# Patient Record
Sex: FEMALE | Race: BLACK OR AFRICAN AMERICAN | NOT HISPANIC OR LATINO | Employment: FULL TIME | URBAN - METROPOLITAN AREA
[De-identification: names, ages, dates, MRNs, and addresses within clinical notes are randomized per-mention and may not be internally consistent; named-entity substitution may affect disease eponyms.]

---

## 2019-07-10 ENCOUNTER — HISTORICAL (OUTPATIENT)
Dept: RADIOLOGY | Facility: HOSPITAL | Age: 59
End: 2019-07-10

## 2021-03-08 ENCOUNTER — HISTORICAL (OUTPATIENT)
Dept: ADMINISTRATIVE | Facility: HOSPITAL | Age: 61
End: 2021-03-08

## 2021-03-08 LAB
ABS NEUT (OLG): 3.79 X10(3)/MCL (ref 2.1–9.2)
ALBUMIN SERPL-MCNC: 3.4 GM/DL (ref 3.4–4.8)
ALBUMIN/GLOB SERPL: 1.2 RATIO (ref 1.1–2)
ALP SERPL-CCNC: 73 UNIT/L (ref 40–150)
ALT SERPL-CCNC: 17 UNIT/L (ref 0–55)
AST SERPL-CCNC: 19 UNIT/L (ref 5–34)
BILIRUB SERPL-MCNC: 0.6 MG/DL (ref 0.2–1.2)
BILIRUBIN DIRECT+TOT PNL SERPL-MCNC: 0.2 MG/DL (ref 0–0.5)
BILIRUBIN DIRECT+TOT PNL SERPL-MCNC: 0.4 MG/DL (ref 0–0.8)
BUN SERPL-MCNC: 13.3 MG/DL (ref 9.8–20.1)
CALCIUM SERPL-MCNC: 8.5 MG/DL (ref 8.4–10.2)
CHLORIDE SERPL-SCNC: 105 MMOL/L (ref 98–107)
CHOLEST SERPL-MCNC: 104 MG/DL
CHOLEST/HDLC SERPL: 3 {RATIO} (ref 0–5)
CO2 SERPL-SCNC: 29 MMOL/L (ref 23–31)
CREAT SERPL-MCNC: 0.66 MG/DL (ref 0.57–1.11)
DEPRECATED CALCIDIOL+CALCIFEROL SERPL-MC: 31.6 NG/ML (ref 30–80)
ERYTHROCYTE [DISTWIDTH] IN BLOOD BY AUTOMATED COUNT: 14.8 % (ref 11.5–17)
EST. AVERAGE GLUCOSE BLD GHB EST-MCNC: 174.3 MG/DL
GLOBULIN SER-MCNC: 2.8 GM/DL (ref 2.4–3.5)
GLUCOSE SERPL-MCNC: 95 MG/DL (ref 82–115)
HBA1C MFR BLD: 7.7 %
HCT VFR BLD AUTO: 36.4 % (ref 37–47)
HDLC SERPL-MCNC: 31 MG/DL (ref 40–60)
HGB BLD-MCNC: 11.4 GM/DL (ref 12–16)
LDLC SERPL CALC-MCNC: 54 MG/DL (ref 50–140)
MCH RBC QN AUTO: 28.3 PG (ref 27–31)
MCHC RBC AUTO-ENTMCNC: 31.3 GM/DL (ref 33–36)
MCV RBC AUTO: 90.3 FL (ref 80–94)
NRBC BLD AUTO-RTO: 0 % (ref 0–0.2)
PLATELET # BLD AUTO: 207 X10(3)/MCL (ref 130–400)
PMV BLD AUTO: 11.8 FL (ref 7.4–10.4)
POTASSIUM SERPL-SCNC: 3.6 MMOL/L (ref 3.5–5.1)
PROT SERPL-MCNC: 6.2 GM/DL (ref 5.8–7.6)
RBC # BLD AUTO: 4.03 X10(6)/MCL (ref 4.2–5.4)
SODIUM SERPL-SCNC: 141 MMOL/L (ref 136–145)
TRIGL SERPL-MCNC: 93 MG/DL (ref 0–150)
VLDLC SERPL CALC-MCNC: 19 MG/DL
WBC # SPEC AUTO: 6.9 X10(3)/MCL (ref 4.5–11.5)

## 2021-06-07 ENCOUNTER — HISTORICAL (OUTPATIENT)
Dept: ADMINISTRATIVE | Facility: HOSPITAL | Age: 61
End: 2021-06-07

## 2021-06-07 LAB
ABS NEUT (OLG): 4.3 X10(3)/MCL (ref 2.1–9.2)
ALBUMIN SERPL-MCNC: 3.4 GM/DL (ref 3.4–4.8)
ALBUMIN/GLOB SERPL: 1.1 RATIO (ref 1.1–2)
ALP SERPL-CCNC: 86 UNIT/L (ref 40–150)
ALT SERPL-CCNC: 13 UNIT/L (ref 0–55)
AST SERPL-CCNC: 16 UNIT/L (ref 5–34)
BILIRUB SERPL-MCNC: 0.7 MG/DL (ref 0.2–1.2)
BILIRUBIN DIRECT+TOT PNL SERPL-MCNC: 0.3 MG/DL (ref 0–0.5)
BILIRUBIN DIRECT+TOT PNL SERPL-MCNC: 0.4 MG/DL (ref 0–0.8)
BUN SERPL-MCNC: 16.9 MG/DL (ref 9.8–20.1)
CALCIUM SERPL-MCNC: 9.3 MG/DL (ref 8.4–10.2)
CHLORIDE SERPL-SCNC: 103 MMOL/L (ref 98–107)
CHOLEST SERPL-MCNC: 107 MG/DL
CHOLEST/HDLC SERPL: 3 {RATIO} (ref 0–5)
CO2 SERPL-SCNC: 29 MMOL/L (ref 23–31)
CREAT SERPL-MCNC: 0.8 MG/DL (ref 0.57–1.11)
DEPRECATED CALCIDIOL+CALCIFEROL SERPL-MC: 35 NG/ML (ref 30–80)
ERYTHROCYTE [DISTWIDTH] IN BLOOD BY AUTOMATED COUNT: 14.9 % (ref 11.5–17)
EST. AVERAGE GLUCOSE BLD GHB EST-MCNC: 191.5 MG/DL
GLOBULIN SER-MCNC: 3 GM/DL (ref 2.4–3.5)
GLUCOSE SERPL-MCNC: 111 MG/DL (ref 82–115)
HBA1C MFR BLD: 8.3 %
HCT VFR BLD AUTO: 37 % (ref 37–47)
HDLC SERPL-MCNC: 32 MG/DL (ref 40–60)
HGB BLD-MCNC: 11.9 GM/DL (ref 12–16)
LDLC SERPL CALC-MCNC: 56 MG/DL (ref 50–140)
MCH RBC QN AUTO: 29.1 PG (ref 27–31)
MCHC RBC AUTO-ENTMCNC: 32.2 GM/DL (ref 33–36)
MCV RBC AUTO: 90.5 FL (ref 80–94)
NRBC BLD AUTO-RTO: 0 % (ref 0–0.2)
PLATELET # BLD AUTO: 201 X10(3)/MCL (ref 130–400)
PMV BLD AUTO: 12.1 FL (ref 7.4–10.4)
POTASSIUM SERPL-SCNC: 4 MMOL/L (ref 3.5–5.1)
PROT SERPL-MCNC: 6.4 GM/DL (ref 5.8–7.6)
RBC # BLD AUTO: 4.09 X10(6)/MCL (ref 4.2–5.4)
SODIUM SERPL-SCNC: 140 MMOL/L (ref 136–145)
TRIGL SERPL-MCNC: 96 MG/DL (ref 0–150)
VLDLC SERPL CALC-MCNC: 19 MG/DL
WBC # SPEC AUTO: 7.2 X10(3)/MCL (ref 4.5–11.5)

## 2021-07-18 ENCOUNTER — HISTORICAL (OUTPATIENT)
Dept: ADMINISTRATIVE | Facility: HOSPITAL | Age: 61
End: 2021-07-18

## 2021-07-18 LAB — HEMOCCULT SP1 STL QL: NEGATIVE

## 2021-12-06 ENCOUNTER — HISTORICAL (OUTPATIENT)
Dept: ADMINISTRATIVE | Facility: HOSPITAL | Age: 61
End: 2021-12-06

## 2021-12-06 LAB
ABS NEUT (OLG): 4.21 X10(3)/MCL (ref 2.1–9.2)
ALBUMIN SERPL-MCNC: 3.4 GM/DL (ref 3.4–4.8)
ALBUMIN/GLOB SERPL: 1.2 RATIO (ref 1.1–2)
ALP SERPL-CCNC: 89 UNIT/L (ref 40–150)
ALT SERPL-CCNC: 13 UNIT/L (ref 0–55)
AST SERPL-CCNC: 16 UNIT/L (ref 5–34)
BILIRUB SERPL-MCNC: 0.5 MG/DL (ref 0.2–1.2)
BILIRUBIN DIRECT+TOT PNL SERPL-MCNC: 0.2 MG/DL (ref 0–0.5)
BILIRUBIN DIRECT+TOT PNL SERPL-MCNC: 0.3 MG/DL (ref 0–0.8)
BUN SERPL-MCNC: 13.9 MG/DL (ref 9.8–20.1)
CALCIUM SERPL-MCNC: 9 MG/DL (ref 8.4–10.2)
CHLORIDE SERPL-SCNC: 103 MMOL/L (ref 98–107)
CHOLEST SERPL-MCNC: 114 MG/DL
CHOLEST/HDLC SERPL: 3 {RATIO} (ref 0–5)
CO2 SERPL-SCNC: 31 MMOL/L (ref 23–31)
CREAT SERPL-MCNC: 0.75 MG/DL (ref 0.57–1.11)
DEPRECATED CALCIDIOL+CALCIFEROL SERPL-MC: 33.5 NG/ML (ref 30–80)
ERYTHROCYTE [DISTWIDTH] IN BLOOD BY AUTOMATED COUNT: 14.2 % (ref 11.5–17)
EST. AVERAGE GLUCOSE BLD GHB EST-MCNC: 200.1 MG/DL
GLOBULIN SER-MCNC: 2.9 GM/DL (ref 2.4–3.5)
GLUCOSE SERPL-MCNC: 137 MG/DL (ref 82–115)
HBA1C MFR BLD: 8.6 %
HCT VFR BLD AUTO: 35.7 % (ref 37–47)
HDLC SERPL-MCNC: 33 MG/DL (ref 40–60)
HGB BLD-MCNC: 11.5 GM/DL (ref 12–16)
LDLC SERPL CALC-MCNC: 57 MG/DL (ref 50–140)
MCH RBC QN AUTO: 29.4 PG (ref 27–31)
MCHC RBC AUTO-ENTMCNC: 32.2 GM/DL (ref 33–36)
MCV RBC AUTO: 91.3 FL (ref 80–94)
NRBC BLD AUTO-RTO: 0 % (ref 0–0.2)
PLATELET # BLD AUTO: 211 X10(3)/MCL (ref 130–400)
PMV BLD AUTO: 11.9 FL (ref 7.4–10.4)
POTASSIUM SERPL-SCNC: 3.8 MMOL/L (ref 3.5–5.1)
PROT SERPL-MCNC: 6.3 GM/DL (ref 5.8–7.6)
RBC # BLD AUTO: 3.91 X10(6)/MCL (ref 4.2–5.4)
SODIUM SERPL-SCNC: 141 MMOL/L (ref 136–145)
TRIGL SERPL-MCNC: 118 MG/DL (ref 0–150)
VLDLC SERPL CALC-MCNC: 24 MG/DL
WBC # SPEC AUTO: 7.6 X10(3)/MCL (ref 4.5–11.5)

## 2022-03-03 ENCOUNTER — HISTORICAL (OUTPATIENT)
Dept: ADMINISTRATIVE | Facility: HOSPITAL | Age: 62
End: 2022-03-03

## 2022-03-03 LAB
ABS NEUT (OLG): 3.9 (ref 2.1–9.2)
ALBUMIN SERPL-MCNC: 3.5 G/DL (ref 3.4–4.8)
ALBUMIN/GLOB SERPL: 1.1 {RATIO} (ref 1.1–2)
ALP SERPL-CCNC: 88 U/L (ref 40–150)
ALT SERPL-CCNC: 19 U/L (ref 0–55)
AST SERPL-CCNC: 20 U/L (ref 5–34)
BASOPHILS # BLD AUTO: 0.01 10*3/UL (ref 0–0.2)
BASOPHILS NFR BLD AUTO: 0.1 % (ref 0–1)
BILIRUB SERPL-MCNC: 0.4 MG/DL (ref 0.2–1.2)
BILIRUBIN DIRECT+TOT PNL SERPL-MCNC: 0.2 (ref 0–0.5)
BILIRUBIN DIRECT+TOT PNL SERPL-MCNC: 0.2 (ref 0–0.8)
BUN SERPL-MCNC: 18.3 MG/DL (ref 9.8–20.1)
CALCIUM SERPL-MCNC: 9.2 MG/DL (ref 8.4–10.2)
CHLORIDE SERPL-SCNC: 101 MMOL/L (ref 98–107)
CHOLEST SERPL-MCNC: 234 MG/DL
CHOLEST/HDLC SERPL: 7 {RATIO} (ref 0–5)
CO2 SERPL-SCNC: 30 MMOL/L (ref 23–31)
CREAT SERPL-MCNC: 0.81 MG/DL (ref 0.57–1.11)
DEPRECATED CALCIDIOL+CALCIFEROL SERPL-MC: 31.6 NG/ML (ref 30–80)
EOSINOPHIL # BLD AUTO: 0.07 10*3/UL (ref 0–0.9)
EOSINOPHIL NFR BLD AUTO: 1 % (ref 0–6.4)
ERYTHROCYTE [DISTWIDTH] IN BLOOD BY AUTOMATED COUNT: 15 % (ref 11.5–17)
EST. AVERAGE GLUCOSE BLD GHB EST-MCNC: 162.8 MG/DL
GLOBULIN SER-MCNC: 3.1 G/DL (ref 2.4–3.5)
GLUCOSE SERPL-MCNC: 195 MG/DL (ref 82–115)
HBA1C MFR BLD: 7.3 %
HCT VFR BLD AUTO: 38.4 % (ref 37–47)
HDLC SERPL-MCNC: 35 MG/DL (ref 40–60)
HEMOLYSIS INTERF INDEX SERPL-ACNC: 3
HGB BLD-MCNC: 12.2 G/DL (ref 12–16)
ICTERIC INTERF INDEX SERPL-ACNC: 1
IMM GRANULOCYTES # BLD AUTO: 0.01 10*3/UL (ref 0–0.02)
IMM GRANULOCYTES NFR BLD AUTO: 0.1 % (ref 0–0.43)
LDLC SERPL CALC-MCNC: 133 MG/DL (ref 50–140)
LIPEMIC INTERF INDEX SERPL-ACNC: 13
LYMPHOCYTES # BLD AUTO: 2.33 10*3/UL (ref 0.6–4.6)
LYMPHOCYTES NFR BLD AUTO: 33.7 % (ref 16–44)
MANUAL DIFF? (OHS): NO
MCH RBC QN AUTO: 29.2 PG (ref 27–31)
MCHC RBC AUTO-ENTMCNC: 31.8 G/DL (ref 33–36)
MCV RBC AUTO: 91.9 FL (ref 80–94)
MONOCYTES # BLD AUTO: 0.59 10*3/UL (ref 0.1–1.3)
MONOCYTES NFR BLD AUTO: 8.5 % (ref 4–12.1)
NEUTROPHILS # BLD AUTO: 3.9 10*3/UL (ref 2.1–9.2)
NEUTROPHILS NFR BLD AUTO: 56.6 % (ref 43–73)
NRBC BLD AUTO-RTO: 0 % (ref 0–0.2)
PLATELET # BLD AUTO: 218 10*3/UL (ref 130–400)
PMV BLD AUTO: 12 FL (ref 7.4–10.4)
POTASSIUM SERPL-SCNC: 4.1 MMOL/L (ref 3.5–5.1)
PROT SERPL-MCNC: 6.6 G/DL (ref 5.8–7.6)
RBC # BLD AUTO: 4.18 10*6/UL (ref 4.2–5.4)
SODIUM SERPL-SCNC: 141 MMOL/L (ref 136–145)
TRIGL SERPL-MCNC: 331 MG/DL (ref 0–150)
VLDLC SERPL CALC-MCNC: 66 MG/DL
WBC # SPEC AUTO: 6.9 10*3/UL (ref 4.5–11.5)

## 2022-04-27 ENCOUNTER — HISTORICAL (OUTPATIENT)
Dept: ADMINISTRATIVE | Facility: HOSPITAL | Age: 62
End: 2022-04-27
Payer: MEDICARE

## 2022-04-27 ENCOUNTER — HOSPITAL ENCOUNTER (OUTPATIENT)
Dept: NUTRITION | Facility: HOSPITAL | Age: 62
End: 2022-04-29
Attending: INTERNAL MEDICINE | Admitting: INTERNAL MEDICINE

## 2022-04-27 LAB
ABS NEUT (OLG): 12.19 (ref 2.1–9.2)
ALBUMIN SERPL-MCNC: 4.2 G/DL (ref 3.4–4.8)
ALBUMIN/GLOB SERPL: 1.1 {RATIO} (ref 1.1–2)
ALP SERPL-CCNC: 101 U/L (ref 40–150)
ALT SERPL-CCNC: 16 U/L (ref 0–55)
APPEARANCE, UA: CLEAR
AST SERPL-CCNC: 21 U/L (ref 5–34)
BACTERIA SPEC CULT: NORMAL
BASOPHILS # BLD AUTO: 0 10*3/UL (ref 0–0.2)
BASOPHILS NFR BLD AUTO: 0 %
BILIRUB SERPL-MCNC: 1 MG/DL
BILIRUB UR QL STRIP: NEGATIVE
BILIRUBIN DIRECT+TOT PNL SERPL-MCNC: 0.4 (ref 0–0.5)
BILIRUBIN DIRECT+TOT PNL SERPL-MCNC: 0.6 (ref 0–0.8)
BUDDING YEAST: NORMAL
BUN SERPL-MCNC: 17.8 MG/DL (ref 9.8–20.1)
CALCIUM SERPL-MCNC: 9.4 MG/DL (ref 8.7–10.5)
CASTS, UA: NORMAL
CHLORIDE SERPL-SCNC: 102 MMOL/L (ref 98–107)
CO2 SERPL-SCNC: 27 MMOL/L (ref 23–31)
COLOR UR: YELLOW
CREAT SERPL-MCNC: 0.75 MG/DL (ref 0.55–1.02)
CRYSTALS: NORMAL
ERYTHROCYTE [DISTWIDTH] IN BLOOD BY AUTOMATED COUNT: 14.5 % (ref 11.5–17)
GLOBULIN SER-MCNC: 3.9 G/DL (ref 2.4–3.5)
GLUCOSE (UA): NEGATIVE
GLUCOSE SERPL-MCNC: 195 MG/DL (ref 82–115)
HCT VFR BLD AUTO: 44.3 % (ref 37–47)
HEMOLYSIS INTERF INDEX SERPL-ACNC: 16
HGB BLD-MCNC: 14.4 G/DL (ref 12–16)
HGB UR QL STRIP: NEGATIVE
ICTERIC INTERF INDEX SERPL-ACNC: 1
KETONES UR QL STRIP: NORMAL
LEUKOCYTE ESTERASE UR QL STRIP: NEGATIVE
LIPASE SERPL-CCNC: 29 U/L
LIPEMIC INTERF INDEX SERPL-ACNC: 0
LYMPHOCYTES # BLD AUTO: 1.1 10*3/UL (ref 0.6–4.6)
LYMPHOCYTES NFR BLD AUTO: 8 %
MANUAL DIFF? (OHS): NO
MCH RBC QN AUTO: 29.1 PG (ref 27–31)
MCHC RBC AUTO-ENTMCNC: 32.5 G/DL (ref 33–36)
MCV RBC AUTO: 89.5 FL (ref 80–94)
MONOCYTES # BLD AUTO: 0.2 10*3/UL (ref 0.1–1.3)
MONOCYTES NFR BLD AUTO: 1 %
NEUTROPHILS # BLD AUTO: 12.19 10*3/UL (ref 2.1–9.2)
NEUTROPHILS NFR BLD AUTO: 90 %
NITRITE UR QL STRIP: NEGATIVE
PH UR STRIP: 7 [PH] (ref 5–9)
PLATELET # BLD AUTO: 226 10*3/UL (ref 130–400)
PMV BLD AUTO: 11.6 FL (ref 9.4–12.4)
POTASSIUM SERPL-SCNC: 4.3 MMOL/L (ref 3.5–5.1)
PROT SERPL-MCNC: 8.1 G/DL (ref 5.8–7.6)
PROT UR QL STRIP: NORMAL
RBC # BLD AUTO: 4.95 10*6/UL (ref 4.2–5.4)
RBC #/AREA URNS HPF: NORMAL /[HPF] (ref 0–2)
SMALL ROUND CELLS, UA: NORMAL
SODIUM SERPL-SCNC: 141 MMOL/L (ref 136–145)
SP GR UR STRIP: 1.02 (ref 1–1.03)
SPERM URNS QL MICRO: NORMAL
SQUAMOUS EPITHELIAL, UA: 8 (ref 0–4)
UROBILINOGEN UR STRIP-ACNC: 0.2
WBC # SPEC AUTO: 13.5 10*3/UL (ref 4.5–11.5)
WBC #/AREA URNS HPF: NORMAL /[HPF] (ref 0–2)

## 2022-04-28 LAB
ABS NEUT (OLG): 11.75 (ref 2.1–9.2)
ALBUMIN SERPL-MCNC: 3.9 G/DL (ref 3.4–4.8)
ALBUMIN/GLOB SERPL: 1 {RATIO} (ref 1.1–2)
ALP SERPL-CCNC: 95 U/L (ref 40–150)
ALT SERPL-CCNC: 15 U/L (ref 0–55)
AST SERPL-CCNC: 20 U/L (ref 5–34)
BASOPHILS # BLD AUTO: 0 10*3/UL (ref 0–0.2)
BASOPHILS NFR BLD AUTO: 0 %
BILIRUB SERPL-MCNC: 1 MG/DL
BILIRUBIN DIRECT+TOT PNL SERPL-MCNC: 0.4 (ref 0–0.5)
BILIRUBIN DIRECT+TOT PNL SERPL-MCNC: 0.6 (ref 0–0.8)
BUN SERPL-MCNC: 17.5 MG/DL (ref 9.8–20.1)
CALCIUM SERPL-MCNC: 9.4 MG/DL (ref 8.7–10.5)
CBG: 192 (ref 70–115)
CBG: 253 (ref 70–115)
CBG: 259 (ref 70–115)
CHLORIDE SERPL-SCNC: 102 MMOL/L (ref 98–107)
CO2 SERPL-SCNC: 27 MMOL/L (ref 23–31)
CREAT SERPL-MCNC: 0.73 MG/DL (ref 0.55–1.02)
ERYTHROCYTE [DISTWIDTH] IN BLOOD BY AUTOMATED COUNT: 14.4 % (ref 11.5–17)
GLOBULIN SER-MCNC: 4.1 G/DL (ref 2.4–3.5)
GLUCOSE SERPL-MCNC: 204 MG/DL (ref 82–115)
HCT VFR BLD AUTO: 40.3 % (ref 37–47)
HEMOLYSIS INTERF INDEX SERPL-ACNC: 3
HEMOLYSIS INTERF INDEX SERPL-ACNC: 3
HGB BLD-MCNC: 13.2 G/DL (ref 12–16)
ICTERIC INTERF INDEX SERPL-ACNC: 1
LIPEMIC INTERF INDEX SERPL-ACNC: 2
LYMPHOCYTES # BLD AUTO: 1.2 10*3/UL (ref 0.6–4.6)
LYMPHOCYTES NFR BLD AUTO: 9 %
MAGNESIUM SERPL-MCNC: 1.8 MG/DL (ref 1.6–2.6)
MANUAL DIFF? (OHS): NO
MCH RBC QN AUTO: 29.6 PG (ref 27–31)
MCHC RBC AUTO-ENTMCNC: 32.8 G/DL (ref 33–36)
MCV RBC AUTO: 90.4 FL (ref 80–94)
MONOCYTES # BLD AUTO: 0.4 10*3/UL (ref 0.1–1.3)
MONOCYTES NFR BLD AUTO: 3 %
NEUTROPHILS # BLD AUTO: 11.75 10*3/UL (ref 2.1–9.2)
NEUTROPHILS NFR BLD AUTO: 88 %
PHOSPHATE SERPL-MCNC: 2.8 MG/DL (ref 2.3–4.7)
PLATELET # BLD AUTO: 216 10*3/UL (ref 130–400)
PMV BLD AUTO: 11.7 FL (ref 9.4–12.4)
POTASSIUM SERPL-SCNC: 3.4 MMOL/L (ref 3.5–5.1)
PROT SERPL-MCNC: 8 G/DL (ref 5.8–7.6)
RBC # BLD AUTO: 4.46 10*6/UL (ref 4.2–5.4)
SODIUM SERPL-SCNC: 140 MMOL/L (ref 136–145)
WBC # SPEC AUTO: 13.4 10*3/UL (ref 4.5–11.5)

## 2022-04-29 LAB — CBG: 196 (ref 70–115)

## 2022-05-02 ENCOUNTER — LAB REQUISITION (OUTPATIENT)
Dept: LAB | Facility: HOSPITAL | Age: 62
End: 2022-05-02
Payer: MEDICARE

## 2022-05-02 DIAGNOSIS — E11.65 TYPE 2 DIABETES MELLITUS WITH HYPERGLYCEMIA: ICD-10-CM

## 2022-05-02 LAB
ALBUMIN SERPL-MCNC: 3.7 GM/DL (ref 3.4–4.8)
ALBUMIN/GLOB SERPL: 1.2 RATIO (ref 1.1–2)
ALP SERPL-CCNC: 73 UNIT/L (ref 40–150)
ALT SERPL-CCNC: 21 UNIT/L (ref 0–55)
AST SERPL-CCNC: 30 UNIT/L (ref 5–34)
BILIRUBIN DIRECT+TOT PNL SERPL-MCNC: 0.4 MG/DL (ref 0–0.5)
BILIRUBIN DIRECT+TOT PNL SERPL-MCNC: 0.7 MG/DL (ref 0–0.8)
BILIRUBIN DIRECT+TOT PNL SERPL-MCNC: 1.1 MG/DL
BUN SERPL-MCNC: 29.6 MG/DL (ref 9.8–20.1)
CALCIUM SERPL-MCNC: 9.7 MG/DL (ref 8.4–10.2)
CHLORIDE SERPL-SCNC: 99 MMOL/L (ref 98–107)
CO2 SERPL-SCNC: 29 MMOL/L (ref 23–31)
CREAT SERPL-MCNC: 0.95 MG/DL (ref 0.55–1.02)
ERYTHROCYTE [DISTWIDTH] IN BLOOD BY AUTOMATED COUNT: 14.5 % (ref 11.5–17)
GLOBULIN SER-MCNC: 3.1 GM/DL (ref 2.4–3.5)
GLUCOSE SERPL-MCNC: 104 MG/DL (ref 82–115)
HCT VFR BLD AUTO: 44 % (ref 37–47)
HGB BLD-MCNC: 14.4 GM/DL (ref 12–16)
MCH RBC QN AUTO: 29.8 PG (ref 27–31)
MCHC RBC AUTO-ENTMCNC: 32.7 MG/DL (ref 33–36)
MCV RBC AUTO: 91.1 FL (ref 80–94)
NRBC BLD AUTO-RTO: 0 %
PLATELET # BLD AUTO: 250 X10(3)/MCL (ref 130–400)
PMV BLD AUTO: 11.4 FL (ref 9.4–12.4)
POTASSIUM SERPL-SCNC: 3.2 MMOL/L (ref 3.5–5.1)
PROT SERPL-MCNC: 6.8 GM/DL (ref 5.8–7.6)
RBC # BLD AUTO: 4.83 X10(6)/MCL (ref 4.2–5.4)
SODIUM SERPL-SCNC: 139 MMOL/L (ref 136–145)
WBC # SPEC AUTO: 11.1 X10(3)/MCL (ref 4.5–11.5)

## 2022-05-02 PROCEDURE — 85027 COMPLETE CBC AUTOMATED: CPT | Performed by: FAMILY MEDICINE

## 2022-05-02 PROCEDURE — 80053 COMPREHEN METABOLIC PANEL: CPT | Performed by: FAMILY MEDICINE

## 2022-05-14 NOTE — DISCHARGE SUMMARY
Admit and Discharge Dates  Admit Date: 04/27/2022  Discharge Date: 04/29/2022  Physicians  Attending Physician - Nickolas GUTHRIE, Annmarie PATTERSON  Admitting Physician - Nickolas GUTHRIE, Annmarie PATTERSON  Primary Care Physician - Physician MD, Non Staff  Discharge Diagnosis  1.?DM gastroparesis?E11.43  2.?Intractable nausea and vomiting?R11.2,?Intractable nausea and vomiting?R11.2  ?  1. ?Diabetic gastroparesis?flare  2.? Dehydration  3.? HTN urgency  ?   HX T2DM, HTN, HLD, CVA/right deficit  Surgical Procedures  No procedures recorded for this visit.  Immunizations  No immunizations recorded for this visit.  Admission Information  This is a 61-year-old female medical history of?obesity, remote CVA with right-sided weakness, HTN, T2DM,?diabetic gastroparesis?presented to the ED?with complaint of?upper abdominal pain,?nausea and nonbloody?vomiting that started this morning. ?Report unable to keep anything down.??Last bowel movement yesterday,?no diarrhea. ?Denies fever or chills.  ?   On arrival to ED, She was afebrile, hypertensive, saturating 97% on room air. ?Labs notable for?glucose 195, CO2 27, Potassium 4.3,?normal lipase, CBC hemoconcentrated compared to her baseline.? X-ray KUB-radiology read pending, nonspecific?bowel gas?pattern?on my review.  ?   She was given IV hydralazine,?Reglan,?1 L of normal saline?and referred to hospital medicine service for further evaluation and management. [1]  Hospital Course  Essentially admitted for nausea and vomiting.? She had a CT scan done which was negative.? No other issues at this time. ?She has improved and tolerating diet. ?No nausea or vomiting this morning. ?No abdominal pain.? I will put her on Reglan as needed before meals?to get her over this episode of gastroparesis.? Also blood pressure has been high and thus we will adjust some of her medications.  Time Spent on discharge  35 minutes  Objective  Vitals & Measurements  T:?36.9? ?C (Oral)? TMIN:?36.3? ?C (Oral)? TMAX:?36.9? ?C (Oral)?  HR:?91(Peripheral)? RR:?20? BP:?150/82? SpO2:?94%?  Physical Exam  General: In no acute distress, afebrile  Chest: Clear to auscultation bilaterally  Heart: S1, S2, no appreciable murmur  Abdomen: Soft, nontender, BS +  MSK: Warm, no lower extremity edema, no clubbing or cyanosis  Neurologic: Alert and oriented x4  Patient Discharge Condition  Improved and stable  Nursing home   Discharge Medication Reconciliation  Prescribed  amLODIPine (amLODIPine 10 mg oral tablet)?10 mg, Oral, Daily  hydrALAZINE (hydrALAZINE 50 mg oral tablet)?50 mg, Oral, BID  losartan (losartan 100 mg oral tablet)?100 mg, Oral, Daily  metoclopramide (Reglan 5 mg oral tablet)?5 mg, Oral, TIDAC, PRN nausea/vomiting  Continue  aspirin (aspirin 81 mg oral tablet)?81 mg, Oral, Daily  clopidogrel (CLOPIDOGREL 75 MG TABLET)?75 mg, Oral, Daily  linaclotide (Linzess)?145 mcg, Oral, Daily  nebivolol (Bystolic 5 mg oral tablet)?5 mg, Oral, At Bedtime  Discontinue  amLODIPine (AMLODIPINE BESYLATE 5 MG TAB)?5 mg, Oral, Daily  insulin glargine (Lantus Solostar Pen 100 units/mL subcutaneous solution)?10 units, Subcutaneous, Daily  lisinopril (lisinopril 10 mg oral tablet)?10 mg, Oral, Daily  pantoprazole (Protonix 40 mg ORAL enteric coated tablet)?40 mg, Oral, Daily  Education and Orders Provided  Diabetes Basics  DASH Eating Plan  Discharge - 04/29/22 8:56:00 CDT, Home, Give all scheduled vaccinations prior to discharge.?  Discharge Activity - Activity as Tolerated?  Discharge Diet - Regular?  Discharge - 04/29/22 10:19:00 CDT, Post-Acute Services/Facilities, Give all scheduled vaccinations prior to discharge.?  Follow up  Follow-up with PCP in 3 to 5 days  Please bring blood sugar log and blood pressure log to next PCP visit.  sonia mehta [phone]  [employer]  No Qualifying Data     [1] Admission H & P; Annmarie Olmos MD 04/28/2022 00:07 CDT

## 2022-05-14 NOTE — ED PROVIDER NOTES
Patient:   Lindy Quezada             MRN: [id number]            FIN: [id number]               Age:   61 years     Sex:  Female     :  1960   Associated Diagnoses:   None   Author:   Sher Che MD      Addendum      Teaching-Supervisory Addendum-Brief   I participated in the following activities of this patients care: medical decision making.   I personally performed: supervision of the patient's care, the medical history, the physical exam, the medical decision making.   The case was discussed with: Diego Hamilton PA-C,.   Results interpretation: I agree with the study interpretation in this patient's care with the exception of of as documented by me..   Notes: This case was discussed with Diego Hamilton PA-C, An independent history and physical examination performed by me.    61-year-old female presents with abdominal pain nausea vomiting onset this morning.  Reports she had a bowel movement yesterday.  No fever or chills.  Mild tenderness in epigastric region.  Does have diabetes concern for gastroenteritis.  After multiple medications in the ED still having nausea and vomiting.  Admit for intractable nausea.   Vital signs: Basic Oxygen Information   2022 22:00 CDT      SpO2                      96 %                             Oxygen Therapy            Nasal cannula                             Oxygen Flow Rate          2 L/min    2022 21:00 CDT      SpO2                      94 %                             Oxygen Therapy            Room air    2022 20:00 CDT      SpO2                      93 %  LOW                             Oxygen Therapy            Room air    2022 19:35 CDT      SpO2                      94 %                             Oxygen Therapy            Room air    2022 19:00 CDT      Oxygen Therapy            Room air    2022 17:00 CDT      SpO2                      88 %  LOW                             Oxygen Therapy            Room air     4/27/2022 16:00 CDT      SpO2                      100 %    4/27/2022 15:40 CDT      SpO2                      97 %                             Oxygen Therapy            Room air  .

## 2022-05-14 NOTE — ED PROVIDER NOTES
Patient:   Lindy Quezada             MRN: [id number]            FIN: [id number]               Age:   61 years     Sex:  Female     :  1960   Associated Diagnoses:   Intractable nausea and vomiting; Intractable nausea and vomiting   Author:   Diego Hamilton PA-C      Basic Information   Time seen: Date & time 2022 15:51:00.   History source: Patient, EMS.   Arrival mode: Ambulance.   History limitation: None.   Additional information: Chief Complaint from Nursing Triage Note : Chief Complaint   2022 15:40 CDT      Chief Complaint           Pt c/o n/v, worsening. EMS gave 4mg Zofran IM. Hx. gastroparesis  .      History of Present Illness   The patient presents with   61 year old female with hx of gastroparesis presents to ED for worsening nausea and vomiting that began today - JAIRO Villalobos  and     Diego MCINTYRE PA-C, assumed care of this patient.   60 y/o F with hx of HTN, DM, CVA, and gastroparesis presents to the ED with persistent n/v onset this morning upon waking. Denies abdominal pain, constipation, diarrhea, fever, dysuria. Last BM was yesterday. .  The onset was this morning .  The course/duration of symptoms is constant.  The character of symptoms is bilious.  The degree at present is moderate.  The exacerbating factor is none.  The relieving factor is none.  Risk factors consist of diabetes mellitus.  Therapy today: emergency medical services.  Associated symptoms: none.  Additional history: none.        Review of Systems   Constitutional symptoms:  No fever, no chills.    Skin symptoms:  No jaundice, no rash.    Eye symptoms:  Vision unchanged, No blurred vision,    Respiratory symptoms:  No shortness of breath, no orthopnea, no cough, no sputum production.    Cardiovascular symptoms:  No chest pain, no palpitations, no diaphoresis, no peripheral edema.    Gastrointestinal symptoms:  Nausea, vomiting, no abdominal pain, no diarrhea, no constipation.     Genitourinary symptoms:  No dysuria, no hematuria.    Neurologic symptoms:  No headache, no dizziness.    Hematologic/Lymphatic symptoms:  Bleeding tendency negative,    Allergy/immunologic symptoms:  No impaired immunity,              Additional review of systems information: All other systems reviewed and otherwise negative.      Health Status   Allergies: No known allergies.   Medications:  (Selected)   Prescriptions  Prescribed  Bystolic 5 mg oral tablet: 5 mg = 1 tab(s), Oral, At Bedtime, # 14 tab(s), 0 Refill(s)  Lantus Solostar Pen 100 units/mL subcutaneous solution: 39 units, Subcutaneous, Daily, # 10 mL, 0 Refill(s)  Protonix 40 mg ORAL enteric coated tablet: 40 mg = 1 tab(s), Oral, BID, # 28 tab(s), 0 Refill(s)  lisinopril 10 mg oral tablet: 10 mg = 1 tab(s), Oral, Daily, # 14 tab(s), 0 Refill(s)  Documented Medications  Documented  AMLODIPINE BESYLATE 5 MG TAB: 5 mg = 1 tab(s), Oral, Daily  CLOPIDOGREL 75 MG TABLET: 75 mg = 1 tab(s), Oral, Daily  Linzess: 145 mcg, Oral, Daily, 0 Refill(s)  aspirin 81 mg oral tablet: 81 mg = 1 tab(s), Oral, Daily, # 30 tab(s), 0 Refill(s).   Immunizations: Per nurse's notes.      Past Medical/ Family/ Social History   Medical history:    Active  HTN - Hypertension ([id number])  DM - Diabetes mellitus (072475607)  Gastroparesis ([id number])  Resolved  CVA - Cerebrovascular accident ([id number]):  Resolved.  .   Surgical history: Negative.   Family history: Not significant.   Social history:    Social & Psychosocial Habits    Alcohol  11/15/2016  Use: Past    Substance Use  11/15/2016  Use: Never    Tobacco  11/15/2016  Use: Never smoker  .   Problem list:    Active Problems (4)  CVA (cerebral vascular accident)   DM (diabetes mellitus)   HTN - Hypertension   Impaired mobility   .      Physical Examination               Vital Signs   Vital Signs   4/27/2022 15:40 CDT      Temperature Oral          36.6 DegC                             Temperature Oral (calculated)              97.88 DegF                             Peripheral Pulse Rate     96 bpm                             Respiratory Rate          16 br/min                             SpO2                      97 %                             Oxygen Therapy            Room air                             Systolic Blood Pressure   189 mmHg  HI                             Diastolic Blood Pressure  94 mmHg  HI  .   Oxygen saturation.   General:  Alert, no acute distress.    Skin:  Warm, dry.    Head:  Normocephalic, atraumatic.    Neck:  Supple, trachea midline.    Eye:  Normal conjunctiva.   Ears, nose, mouth and throat:  Oral mucosa moist.   Cardiovascular:  Regular rate and rhythm, No murmur.    Respiratory:  Lungs are clear to auscultation, respirations are non-labored, breath sounds are equal.    Gastrointestinal:  Soft, Non distended, Tenderness: Negative, Guarding: Negative, Rebound: Negative.    Musculoskeletal:  Normal ROM.   Neurological:  Alert and oriented to person, place, time, and situation, No focal neurological deficit observed.    Psychiatric:  Cooperative.      Medical Decision Making   Differential Diagnosis:  Nausea, vomiting, abdominal pain, gastroenteritis, gastritis, bowel obstruction, dehydration, electrolyte abnormality, urinary tract infection.    Rationale:  61 y.o female with a history of gastroparesis presents to the ED with persistent nausea vomiting onset this morning.  Patient was given IM Zofran in route per EMS as well as a dose of Reglan and Phenergan here in the ED with no relief.  Patient's labs unremarkable; no signs of anion gap indicative of DKA. Pending urine. Given IV fluids in the ED. Will admit for intractable n/v due to gastroparesis. .   Documents reviewed:  Emergency department nurses' notes.   Orders  Launch Orders   Laboratory:  Urinalysis with Reflex (Order): Stat collect, Urine, 4/27/2022 15:54 CDT, Nurse collect, Print Label By Order Location  Lipase Level (Order): Stat collect,  4/27/2022 15:54 CDT, Blood, Lab Collect, Print Label By Order Location, 4/27/2022 15:54 CDT  CBC w/ Auto Diff (Order): Stat collect, 4/27/2022 15:54 CDT, Blood, Lab Collect, Print Label By Order Location, 4/27/2022 15:54 CDT  CMP (Order): Stat collect, 4/27/2022 15:54 CDT, Blood, Lab Collect, Print Label By Order Location, 4/27/2022 15:54 CDT, Launch Orders   Pharmacy:  Reglan (Order): 10 mg, form: Injection, IV Push, Once, first dose 4/27/2022 15:55 CDT, stop date 4/27/2022 15:55 CDT, STAT.    Results review:  Lab results : Lab View   4/27/2022 21:07 CDT      Est Creat Clearance Ser   77.65 mL/min    4/27/2022 20:29 CDT      Sodium Lvl                141 mmol/L                             Potassium Lvl             4.3 mmol/L                             Chloride                  102 mmol/L                             CO2                       27 mmol/L                             Calcium Lvl               9.4 mg/dL                             Glucose Lvl               195 mg/dL  HI                             BUN                       17.8 mg/dL                             Creatinine                0.75 mg/dL                             eGFR-AA                   >60  NA                             eGFR-JULIA                  >60 mL/min/1.73 m2  NA                             Bili Total                1.0 mg/dL                             Bili Direct               0.4 mg/dL                             Bili Indirect             0.60 mg/dL                             AST                       21 unit/L                             ALT                       16 unit/L                             Alk Phos                  101 unit/L                             Total Protein             8.1 gm/dL  HI                             Albumin Lvl               4.2 gm/dL                             Globulin                  3.9 gm/dL  HI                             A/G Ratio                 1.1 ratio                              Restick                   Done    4/27/2022 18:43 CDT      Restick                   Done    4/27/2022 16:04 CDT      Lipase Lvl                29 U/L                             WBC                       13.5 x10(3)/mcL  HI                             RBC                       4.95 x10(6)/mcL                             Hgb                       14.4 gm/dL                             Hct                       44.3 %                             Platelet                  226 x10(3)/mcL                             MCV                       89.5 fL                             MCH                       29.1 pg                             MCHC                      32.5 gm/dL  LOW                             RDW                       14.5 %                             MPV                       11.6 fL                             Abs Neut                  12.19 x10(3)/mcL  HI                             Neutro Auto               90 %  NA                             Lymph Auto                8 %  NA                             Mono Auto                 1 %  NA                             Basophil Auto             0 %  NA                             Abs Neutro                12.19 x10(3)/mcL  HI                             Abs Lymph                 1.1 x10(3)/mcL                             Abs Mono                  0.2 x10(3)/mcL                             Abs Baso                  0.0 x10(3)/mcL    .      Reexamination/ Reevaluation   Vital signs   Basic Oxygen Information   4/27/2022 15:40 CDT      SpO2                      97 %                             Oxygen Therapy            Room air        Impression and Plan   Diagnosis   Intractable nausea and vomiting (ODR81-BJ R11.2)    Diagnosis code search    History of diabetic gastroparesis (IBY15-KW Z86.39)      Calls-Consults   -  4/27/2022 21:30:00 , Isi Craig HM, consult, recommends admit for obs/IVF.    Plan   Condition: Stable.    Disposition: Admit  time  4/27/2022 21:30:00, Place in Observation Unit, Nickolas GUTHRIE, Annmarie GUERRA    Notes: I spoke with Dr. Che regarding the patient's care. He saw the patient face-to-face and agrees with the plan moving forward. .

## 2022-05-20 NOTE — HISTORICAL [facility] CERNER
This is a historical note converted from Hilda. Formatting and pictures may have been removed.  Please reference Hilda for original formatting and attached multimedia. Chief Complaint  Pt c/o n/v, worsening. EMS gave 4mg Zofran IM. Hx. gastroparesis  History of Present Illness  This is a 61-year-old female medical history of?obesity, remote CVA with right-sided weakness, HTN, T2DM,?diabetic gastroparesis?presented to the ED?with complaint of?upper abdominal pain,?nausea and nonbloody?vomiting that started this morning. ?Report unable to keep anything down.??Last bowel movement yesterday,?no diarrhea. ?Denies fever or chills.  ?  On arrival to ED, She was afebrile, hypertensive, saturating 97% on room air. ?Labs notable for?glucose 195, CO2 27, Potassium 4.3,?normal lipase, CBC hemoconcentrated compared to her baseline.? X-ray KUB-radiology read pending, nonspecific?bowel gas?pattern?on my review.  ?  She was given IV hydralazine,?Reglan,?1 L of normal saline?and referred to hospital medicine service for further evaluation and management.  Review of Systems  Except as documented, all other systems reviewed and are negative.  Physical Exam  Vitals & Measurements  T:?36.6? ?C (Oral)? HR:?106(Peripheral)? RR:?20? BP:?157/79? SpO2:?96%? WT:?116?kg?  General: Appears comfortable  HEENT:?NC/AT  Neck:? No JVD  Chest:?CTABL  CVS:?Regular rhythm. Normal S1/S2.  Abdomen:?nondistended, normoactive BS, soft and non-tender.  MSK:?No obvious deformity or joint swelling  Skin:?Warm and dry  Neuro:?AAOx3, no focal neurological deficit  Psych:?Cooperative  Assessment/Plan  1. ?Diabetic gastroparesis?flare  2.? Dehydration  3.? HTN urgency  ?  HX T2DM, HTN, HLD, CVA/right deficit  ?  Plan:  ?  - Clear liquid diet  - LR at 100 ml/hr  - Metoclopramide 5mg IV q8h  - PRN antiemetics  - SSI#2 q6h  - PRN IV antihypertensives for BP > 160/9000  - Home Meds pending verification ---Resumed amlodipine 10 mg,?nebivolol?10 mg,?losartan 100 mg,  hydralazine 50mg BID and Plavix and PPI  ?  PCP: Non staff  Code status: Full  ?  Critical care time:?31 minutes  Critical care diagnosis:?Hypertensive urgency   Problem List/Past Medical History  Old CVA/right-sided weakness  T2DM  Diabetic gastroparesis  Hypertension  Procedure/Surgical History  EGD  Colonoscopy  Medications  Inpatient  acetaminophen, 650 mg= 20.3 mL, Oral, q6hr, PRN  amLODIPine, 10 mg= 2 tab(s), Oral, Daily  atorvastatin 40 mg oral tablet, 80 mg= 2 tab(s), Oral, Daily  Bystolic, 10 mg, Oral, Daily  Dextrose 10% in Water intravenous solution, 125 mL, IV, As Directed, PRN  Dextrose 10% in Water intravenous solution, 125 mL, IV, As Directed, PRN  Dextrose 10% in Water intravenous solution, 125 mL, IV, Once, PRN  Dextrose 10% in Water intravenous solution, 250 mL, IV, As Directed, PRN  Dulcolax Laxative 10 mg RECTAL suppository, 10 mg= 1 supp, VT (rectal), Daily, PRN  enoxaparin, 40 mg= 0.4 mL, Subcutaneous, Daily  glucagon, 1 mg= 1 EA, IM, q10min, PRN  glucagon, 1 mg= 1 EA, IM, q10min, PRN  hydrALAZINE (Apresoline) Oral, 50 mg= 1 tab(s), Oral, BID  insulin lispro, 2-14 units, Subcutaneous, As Directed, PRN  labetalol, 10 mg= 2 mL, IV Push, q2hr, PRN  Linzess 145 mcg oral capsule, 145 mcg= 1 cap(s), Oral, Daily  losartan 50 mg oral tablet, 100 mg= 2 tab(s), Oral, Daily  [id number] 1,000 mL, 1000 mL, IV  metoclopramide, 5 mg= 1 mL, IV Push, q7bn-Anpxi  Phenergan, 12.5 mg= 0.5 mL, IM, q4hr, PRN  Plavix 75 mg oral tablet, 75 mg= 1 tab(s), Oral, Daily  Protonix, 40 mg= 1 EA, IV Slow, Daily  Zofran, 4 mg= 2 mL, IV Push, q4hr, PRN  Home  AMLODIPINE BESYLATE 5 MG TAB, 5 mg= 1 tab(s), Oral, Daily  aspirin 81 mg oral tablet, 81 mg= 1 tab(s), Oral, Daily  Bystolic 5 mg oral tablet, 5 mg= 1 tab(s), Oral, At Bedtime  CLOPIDOGREL 75 MG TABLET, 75 mg= 1 tab(s), Oral, Daily  Lantus Solostar Pen 100 units/mL subcutaneous solution, 39 units, Subcutaneous, Daily  Linzess, 145 mcg, Oral, Daily  lisinopril 10 mg oral  tablet, 10 mg= 1 tab(s), Oral, Daily  Protonix 40 mg ORAL enteric coated tablet, 40 mg= 1 tab(s), Oral, BID  Allergies  No Known Medication Allergies  Social History  Alcohol  Past, 11/15/2016  Substance Use  Never, 11/15/2016  Tobacco  Never smoker, 11/15/2016  Family History  Reviewed and negative  Lab Results  Labs Last 24 Hours?  ?Chemistry? Hematology/Coagulation?   Sodium Lvl: 141 mmol/L (04/27/22 20:29:00) WBC:?13.5 x10(3)/mcL?High (04/27/22 16:04:00)   Potassium Lvl: 4.3 mmol/L (04/27/22 20:29:00) RBC: 4.95 x10(6)/mcL (04/27/22 16:04:00)   Chloride: 102 mmol/L (04/27/22 20:29:00) Hgb: 14.4 gm/dL (04/27/22 16:04:00)   CO2: 27 mmol/L (04/27/22 20:29:00) Hct: 44.3 % (04/27/22 16:04:00)   Calcium Lvl: 9.4 mg/dL (04/27/22 20:29:00) Platelet: 226 x10(3)/mcL (04/27/22 16:04:00)   Glucose Lvl:?195 mg/dL?High (04/27/22 20:29:00) MCV: 89.5 fL (04/27/22 16:04:00)   BUN: 17.8 mg/dL (04/27/22 20:29:00) MCH: 29.1 pg (04/27/22 16:04:00)   Creatinine: 0.75 mg/dL (04/27/22 20:29:00) MCHC:?32.5 gm/dL?Low (04/27/22 16:04:00)   Est Creat Clearance Ser: 77.65 mL/min (04/27/22 21:07:46) RDW: 14.5 % (04/27/22 16:04:00)   eGFR-AA: >60 (04/27/22 20:29:00) MPV: 11.6 fL (04/27/22 16:04:00)   eGFR-JULIA: >60 (04/27/22 20:29:00) Abs Neut:?12.19 x10(3)/mcL?High (04/27/22 16:04:00)   Bili Total: 1 mg/dL (04/27/22 20:29:00) Neutro Auto: 90 % (04/27/22 16:04:00)   Bili Direct: 0.4 mg/dL (04/27/22 20:29:00) Lymph Auto: 8 % (04/27/22 16:04:00)   Bili Indirect: 0.6 mg/dL (04/27/22 20:29:00) Mono Auto: 1 % (04/27/22 16:04:00)   AST: 21 unit/L (04/27/22 20:29:00) Basophil Auto: 0 % (04/27/22 16:04:00)   ALT: 16 unit/L (04/27/22 20:29:00) Abs Neutro:?12.19 x10(3)/mcL?High (04/27/22 16:04:00)   Alk Phos: 101 unit/L (04/27/22 20:29:00) Abs Lymph: 1.1 x10(3)/mcL (04/27/22 16:04:00)   Total Protein:?8.1 gm/dL?High (04/27/22 20:29:00) Abs Mono: 0.2 x10(3)/mcL (04/27/22 16:04:00)   Albumin Lvl: 4.2 gm/dL (04/27/22 20:29:00) Abs Baso: 0 x10(3)/mcL  (04/27/22 16:04:00)   Globulin:?3.9 gm/dL?High (04/27/22 20:29:00)    A/G Ratio: 1.1 ratio (04/27/22 20:29:00)    Lipase Lvl: 29 U/L (04/27/22 16:04:00)    Restick: Done (04/27/22 20:29:00)        Prolonged Care Note  Time in: 705A  Time out:740A  Time spent: 35 min  A/P:  61 year old admitted to us for n/v without abd pain to me reported by patient.? she stated this happened before as well and responded to a soap suds enema.?  ?   on exam abd is soft non tender dull to percussion, obese  ?   constipation  n/v  gastritis - viral likely  ?   soap suds enema, continue oral laxatives  continue reglan iv scheduled  mild leukocytosis noted, does not appear to be septic.? will get ct abd

## 2022-06-06 ENCOUNTER — LAB REQUISITION (OUTPATIENT)
Dept: LAB | Facility: HOSPITAL | Age: 62
End: 2022-06-06
Payer: MEDICARE

## 2022-06-06 DIAGNOSIS — E78.2 MIXED HYPERLIPIDEMIA: ICD-10-CM

## 2022-06-06 DIAGNOSIS — I10 ESSENTIAL (PRIMARY) HYPERTENSION: ICD-10-CM

## 2022-06-06 DIAGNOSIS — E11.9 TYPE 2 DIABETES MELLITUS WITHOUT COMPLICATIONS: ICD-10-CM

## 2022-06-06 DIAGNOSIS — D64.9 ANEMIA, UNSPECIFIED: ICD-10-CM

## 2022-06-06 DIAGNOSIS — E55.9 VITAMIN D DEFICIENCY, UNSPECIFIED: ICD-10-CM

## 2022-06-06 LAB
ALBUMIN SERPL-MCNC: 3.4 GM/DL (ref 3.4–4.8)
ALBUMIN/GLOB SERPL: 1.3 RATIO (ref 1.1–2)
ALP SERPL-CCNC: 80 UNIT/L (ref 40–150)
ALT SERPL-CCNC: 12 UNIT/L (ref 0–55)
AST SERPL-CCNC: 16 UNIT/L (ref 5–34)
BILIRUBIN DIRECT+TOT PNL SERPL-MCNC: 0.6 MG/DL
BUN SERPL-MCNC: 18.7 MG/DL (ref 9.8–20.1)
CALCIUM SERPL-MCNC: 9.7 MG/DL (ref 8.4–10.2)
CHLORIDE SERPL-SCNC: 107 MMOL/L (ref 98–107)
CHOLEST SERPL-MCNC: 93 MG/DL
CHOLEST/HDLC SERPL: 3 {RATIO} (ref 0–5)
CO2 SERPL-SCNC: 28 MMOL/L (ref 23–31)
CREAT SERPL-MCNC: 0.75 MG/DL (ref 0.55–1.02)
DEPRECATED CALCIDIOL+CALCIFEROL SERPL-MC: 32.9 NG/ML (ref 30–80)
ERYTHROCYTE [DISTWIDTH] IN BLOOD BY AUTOMATED COUNT: 14.1 % (ref 11.5–17)
EST. AVERAGE GLUCOSE BLD GHB EST-MCNC: 182.9 MG/DL
GLOBULIN SER-MCNC: 2.6 GM/DL (ref 2.4–3.5)
GLUCOSE SERPL-MCNC: 112 MG/DL (ref 82–115)
HBA1C MFR BLD: 8 %
HCT VFR BLD AUTO: 34.5 % (ref 37–47)
HDLC SERPL-MCNC: 31 MG/DL (ref 35–60)
HGB BLD-MCNC: 11.1 GM/DL (ref 12–16)
LDLC SERPL CALC-MCNC: 45 MG/DL (ref 50–140)
MCH RBC QN AUTO: 29.2 PG (ref 27–31)
MCHC RBC AUTO-ENTMCNC: 32.2 MG/DL (ref 33–36)
MCV RBC AUTO: 90.8 FL (ref 80–94)
NRBC BLD AUTO-RTO: 0 %
PLATELET # BLD AUTO: 224 X10(3)/MCL (ref 130–400)
PMV BLD AUTO: 11.5 FL (ref 9.4–12.4)
POTASSIUM SERPL-SCNC: 4.2 MMOL/L (ref 3.5–5.1)
PROT SERPL-MCNC: 6 GM/DL (ref 5.8–7.6)
RBC # BLD AUTO: 3.8 X10(6)/MCL (ref 4.2–5.4)
SODIUM SERPL-SCNC: 142 MMOL/L (ref 136–145)
TRIGL SERPL-MCNC: 83 MG/DL (ref 37–140)
VLDLC SERPL CALC-MCNC: 17 MG/DL
WBC # SPEC AUTO: 6.8 X10(3)/MCL (ref 4.5–11.5)

## 2022-06-06 PROCEDURE — 80061 LIPID PANEL: CPT | Performed by: FAMILY MEDICINE

## 2022-06-06 PROCEDURE — 84075 ASSAY ALKALINE PHOSPHATASE: CPT | Performed by: FAMILY MEDICINE

## 2022-06-06 PROCEDURE — 80053 COMPREHEN METABOLIC PANEL: CPT | Performed by: FAMILY MEDICINE

## 2022-06-06 PROCEDURE — 82306 VITAMIN D 25 HYDROXY: CPT | Performed by: FAMILY MEDICINE

## 2022-06-06 PROCEDURE — 83036 HEMOGLOBIN GLYCOSYLATED A1C: CPT | Performed by: FAMILY MEDICINE

## 2022-06-06 PROCEDURE — 85027 COMPLETE CBC AUTOMATED: CPT | Performed by: FAMILY MEDICINE

## 2022-09-01 ENCOUNTER — LAB REQUISITION (OUTPATIENT)
Dept: LAB | Facility: HOSPITAL | Age: 62
End: 2022-09-01
Payer: MEDICARE

## 2022-09-01 DIAGNOSIS — R73.9 HYPERGLYCEMIA, UNSPECIFIED: ICD-10-CM

## 2022-09-01 DIAGNOSIS — E55.9 VITAMIN D DEFICIENCY, UNSPECIFIED: ICD-10-CM

## 2022-09-01 DIAGNOSIS — I10 ESSENTIAL (PRIMARY) HYPERTENSION: ICD-10-CM

## 2022-09-01 DIAGNOSIS — E78.5 HYPERLIPIDEMIA, UNSPECIFIED: ICD-10-CM

## 2022-09-01 LAB
ALBUMIN SERPL-MCNC: 3.5 GM/DL (ref 3.4–4.8)
ALBUMIN/GLOB SERPL: 1.2 RATIO (ref 1.1–2)
ALP SERPL-CCNC: 90 UNIT/L (ref 40–150)
ALT SERPL-CCNC: 13 UNIT/L (ref 0–55)
AST SERPL-CCNC: 17 UNIT/L (ref 5–34)
BILIRUBIN DIRECT+TOT PNL SERPL-MCNC: 0.5 MG/DL
BUN SERPL-MCNC: 17.1 MG/DL (ref 9.8–20.1)
CALCIUM SERPL-MCNC: 9.4 MG/DL (ref 8.4–10.2)
CHLORIDE SERPL-SCNC: 104 MMOL/L (ref 98–107)
CHOLEST SERPL-MCNC: 94 MG/DL
CHOLEST/HDLC SERPL: 3 {RATIO} (ref 0–5)
CO2 SERPL-SCNC: 28 MMOL/L (ref 23–31)
CREAT SERPL-MCNC: 0.86 MG/DL (ref 0.55–1.02)
DEPRECATED CALCIDIOL+CALCIFEROL SERPL-MC: 36.5 NG/ML (ref 30–80)
ERYTHROCYTE [DISTWIDTH] IN BLOOD BY AUTOMATED COUNT: 14.1 % (ref 11.5–17)
EST. AVERAGE GLUCOSE BLD GHB EST-MCNC: 162.8 MG/DL
GFR SERPLBLD CREATININE-BSD FMLA CKD-EPI: >60 MLS/MIN/1.73/M2
GLOBULIN SER-MCNC: 3 GM/DL (ref 2.4–3.5)
GLUCOSE SERPL-MCNC: 216 MG/DL (ref 82–115)
HBA1C MFR BLD: 7.3 %
HCT VFR BLD AUTO: 36.5 % (ref 37–47)
HDLC SERPL-MCNC: 31 MG/DL (ref 35–60)
HGB BLD-MCNC: 11.6 GM/DL (ref 12–16)
LDLC SERPL CALC-MCNC: 40 MG/DL (ref 50–140)
MCH RBC QN AUTO: 29.4 PG (ref 27–31)
MCHC RBC AUTO-ENTMCNC: 31.8 MG/DL (ref 33–36)
MCV RBC AUTO: 92.6 FL (ref 80–94)
NRBC BLD AUTO-RTO: 0 %
PLATELET # BLD AUTO: 215 X10(3)/MCL (ref 130–400)
PMV BLD AUTO: 12.4 FL (ref 7.4–10.4)
POTASSIUM SERPL-SCNC: 4.2 MMOL/L (ref 3.5–5.1)
PROT SERPL-MCNC: 6.5 GM/DL (ref 5.8–7.6)
RBC # BLD AUTO: 3.94 X10(6)/MCL (ref 4.2–5.4)
SODIUM SERPL-SCNC: 144 MMOL/L (ref 136–145)
TRIGL SERPL-MCNC: 113 MG/DL (ref 37–140)
TSH SERPL-ACNC: 1.33 UIU/ML (ref 0.35–4.94)
VLDLC SERPL CALC-MCNC: 23 MG/DL
WBC # SPEC AUTO: 7.5 X10(3)/MCL (ref 4.5–11.5)

## 2022-09-01 PROCEDURE — 80061 LIPID PANEL: CPT | Performed by: FAMILY MEDICINE

## 2022-09-01 PROCEDURE — 83036 HEMOGLOBIN GLYCOSYLATED A1C: CPT | Performed by: FAMILY MEDICINE

## 2022-09-01 PROCEDURE — 84443 ASSAY THYROID STIM HORMONE: CPT | Performed by: FAMILY MEDICINE

## 2022-09-01 PROCEDURE — 82306 VITAMIN D 25 HYDROXY: CPT | Performed by: FAMILY MEDICINE

## 2022-09-01 PROCEDURE — 80053 COMPREHEN METABOLIC PANEL: CPT | Performed by: FAMILY MEDICINE

## 2022-09-01 PROCEDURE — 85027 COMPLETE CBC AUTOMATED: CPT | Performed by: FAMILY MEDICINE

## 2023-07-27 DIAGNOSIS — R41.3 MEMORY LOSS: Primary | ICD-10-CM

## 2023-08-29 ENCOUNTER — APPOINTMENT (OUTPATIENT)
Dept: RADIOLOGY | Facility: HOSPITAL | Age: 63
End: 2023-08-29
Attending: FAMILY MEDICINE
Payer: MEDICARE

## 2023-08-29 DIAGNOSIS — R41.3 MEMORY LOSS: ICD-10-CM

## 2023-08-29 LAB
CREAT SERPL-MCNC: 0.7 MG/DL (ref 0.5–1.4)
SAMPLE: NORMAL

## 2023-08-29 PROCEDURE — 25500020 PHARM REV CODE 255: Performed by: FAMILY MEDICINE

## 2023-08-29 PROCEDURE — 70553 MRI BRAIN STEM W/O & W/DYE: CPT | Mod: TC

## 2023-08-29 PROCEDURE — A9577 INJ MULTIHANCE: HCPCS | Performed by: FAMILY MEDICINE

## 2023-08-29 RX ADMIN — GADOBENATE DIMEGLUMINE 20 ML: 529 INJECTION, SOLUTION INTRAVENOUS at 01:08

## 2024-11-03 ENCOUNTER — ANESTHESIA EVENT (OUTPATIENT)
Dept: ENDOSCOPY | Facility: HOSPITAL | Age: 64
End: 2024-11-03
Payer: MEDICARE

## 2024-11-03 RX ORDER — GLUCAGON 1 MG
1 KIT INJECTION
OUTPATIENT
Start: 2024-11-03

## 2024-11-03 RX ORDER — ONDANSETRON HYDROCHLORIDE 2 MG/ML
4 INJECTION, SOLUTION INTRAVENOUS ONCE AS NEEDED
OUTPATIENT
Start: 2024-11-03 | End: 2036-04-01

## 2024-11-03 RX ORDER — LIDOCAINE HYDROCHLORIDE 10 MG/ML
1 INJECTION, SOLUTION EPIDURAL; INFILTRATION; INTRACAUDAL; PERINEURAL ONCE
OUTPATIENT
Start: 2024-11-03 | End: 2024-11-03

## 2024-11-04 ENCOUNTER — ANESTHESIA (OUTPATIENT)
Dept: ENDOSCOPY | Facility: HOSPITAL | Age: 64
End: 2024-11-04
Payer: MEDICARE

## 2024-11-04 ENCOUNTER — HOSPITAL ENCOUNTER (OUTPATIENT)
Facility: HOSPITAL | Age: 64
Discharge: HOME | End: 2024-11-04
Attending: INTERNAL MEDICINE | Admitting: INTERNAL MEDICINE
Payer: MEDICARE

## 2024-11-04 VITALS
OXYGEN SATURATION: 96 % | WEIGHT: 214 LBS | HEART RATE: 77 BPM | TEMPERATURE: 97 F | SYSTOLIC BLOOD PRESSURE: 135 MMHG | HEIGHT: 69 IN | DIASTOLIC BLOOD PRESSURE: 69 MMHG | RESPIRATION RATE: 14 BRPM | BODY MASS INDEX: 31.7 KG/M2

## 2024-11-04 LAB — POCT GLUCOSE: 351 MG/DL (ref 70–110)

## 2024-11-04 PROCEDURE — 37000008 HC ANESTHESIA 1ST 15 MINUTES: Performed by: INTERNAL MEDICINE

## 2024-11-04 PROCEDURE — G0121 COLON CA SCRN NOT HI RSK IND: HCPCS | Performed by: INTERNAL MEDICINE

## 2024-11-04 PROCEDURE — 63600175 PHARM REV CODE 636 W HCPCS: Performed by: NURSE ANESTHETIST, CERTIFIED REGISTERED

## 2024-11-04 PROCEDURE — 37000009 HC ANESTHESIA EA ADD 15 MINS: Performed by: INTERNAL MEDICINE

## 2024-11-04 PROCEDURE — 25000003 PHARM REV CODE 250: Performed by: NURSE ANESTHETIST, CERTIFIED REGISTERED

## 2024-11-04 RX ORDER — GLYCOPYRROLATE 0.2 MG/ML
INJECTION INTRAMUSCULAR; INTRAVENOUS
Status: DISCONTINUED | OUTPATIENT
Start: 2024-11-04 | End: 2024-11-04

## 2024-11-04 RX ORDER — CLONIDINE HYDROCHLORIDE 0.1 MG/1
0.1 TABLET ORAL EVERY 6 HOURS PRN
COMMUNITY

## 2024-11-04 RX ORDER — FAMOTIDINE 20 MG/1
20 TABLET, FILM COATED ORAL 2 TIMES DAILY
COMMUNITY

## 2024-11-04 RX ORDER — LOSARTAN POTASSIUM 100 MG/1
100 TABLET ORAL DAILY
COMMUNITY

## 2024-11-04 RX ORDER — LIDOCAINE HYDROCHLORIDE 20 MG/ML
INJECTION INTRAVENOUS
Status: DISCONTINUED | OUTPATIENT
Start: 2024-11-04 | End: 2024-11-04

## 2024-11-04 RX ORDER — AMLODIPINE BESYLATE 10 MG/1
10 TABLET ORAL DAILY
COMMUNITY

## 2024-11-04 RX ORDER — CLOPIDOGREL BISULFATE 75 MG/1
75 TABLET ORAL DAILY
COMMUNITY

## 2024-11-04 RX ORDER — NEBIVOLOL 10 MG/1
20 TABLET ORAL DAILY
COMMUNITY

## 2024-11-04 RX ORDER — ROSUVASTATIN CALCIUM 40 MG/1
40 TABLET, COATED ORAL NIGHTLY
COMMUNITY

## 2024-11-04 RX ORDER — PROPOFOL 10 MG/ML
INJECTION, EMULSION INTRAVENOUS
Status: DISCONTINUED | OUTPATIENT
Start: 2024-11-04 | End: 2024-11-04

## 2024-11-04 RX ORDER — HYDRALAZINE HYDROCHLORIDE 50 MG/1
50 TABLET, FILM COATED ORAL 3 TIMES DAILY
COMMUNITY

## 2024-11-04 RX ORDER — DEXMEDETOMIDINE HYDROCHLORIDE 100 UG/ML
INJECTION, SOLUTION INTRAVENOUS
Status: DISCONTINUED | OUTPATIENT
Start: 2024-11-04 | End: 2024-11-04

## 2024-11-04 RX ORDER — HYDRALAZINE HYDROCHLORIDE 20 MG/ML
INJECTION INTRAMUSCULAR; INTRAVENOUS
Status: DISCONTINUED | OUTPATIENT
Start: 2024-11-04 | End: 2024-11-04

## 2024-11-04 RX ADMIN — DEXMEDETOMIDINE 10 MCG: 200 INJECTION, SOLUTION INTRAVENOUS at 09:11

## 2024-11-04 RX ADMIN — LIDOCAINE HYDROCHLORIDE 80 MG: 20 INJECTION INTRAVENOUS at 09:11

## 2024-11-04 RX ADMIN — HYDRALAZINE HYDROCHLORIDE 4 MG: 20 INJECTION INTRAMUSCULAR; INTRAVENOUS at 09:11

## 2024-11-04 RX ADMIN — PROPOFOL 50 MG: 10 INJECTION, EMULSION INTRAVENOUS at 09:11

## 2024-11-04 RX ADMIN — SODIUM CHLORIDE: 9 INJECTION, SOLUTION INTRAVENOUS at 09:11

## 2024-11-04 RX ADMIN — PROPOFOL 30 MG: 10 INJECTION, EMULSION INTRAVENOUS at 09:11

## 2024-11-04 RX ADMIN — GLYCOPYRROLATE 0.2 MG: 0.2 INJECTION INTRAMUSCULAR; INTRAVENOUS at 09:11

## 2024-11-04 NOTE — H&P
"[facility]    CC: Screening    HPI 64 y.o. female here for colorectal cancer screening. No current complaints.     Past Medical History  Past Medical History:   Diagnosis Date    CVA (cerebral vascular accident) 2015    Diabetes mellitus     Hyperlipidemia     Hypertension          Review of Systems  General ROS: negative for chills, fever or weight loss  Cardiovascular ROS: no chest pain or dyspnea on exertion  Gastrointestinal ROS: no abdominal pain, change in bowel habits, or black/ bloody stools    Physical Examination  Ht 5' 9" (1.753 m)   Wt 97.1 kg (214 lb)   BMI 31.60 kg/m²   General appearance: alert, cooperative, no distress  HENT: Normocephalic, atraumatic, neck symmetrical, no nasal discharge   Lungs: clear to auscultation bilaterally, no dullness to percussion bilaterally  Heart: regular rate and rhythm without rub; no displacement of the PMI   Abdomen: soft, non-tender; bowel sounds normoactive; no organomegaly  Extremities: extremities symmetric; no clubbing, cyanosis, or edema  Neurologic: Alert and oriented X 3, normal strength, normal coordination and gait    Labs:    Imaging:    I have personally reviewed and interpreted these images.    Assessment:   64 y.o. female here for colorectal cancer screening.  Risk of bleeding and perforation explained to patient.      Plan:  Colonoscopy      CONSUELO Durand Jr., M.D.  [facility]    "

## 2024-11-04 NOTE — ANESTHESIA PREPROCEDURE EVALUATION
11/03/2024  Lindy Quezada is a 64 y.o., female, who presents for the following:    Procedure: COLON (Abdomen)   Anesthesia type: General/MAC   Diagnosis: Screen for colon cancer [Z12.11]   Location: [facility] ENDO 01 / [facility] ENDOSCOPY   Surgeons: Rory Durand MD       Pre-op Assessment    I have reviewed the Patient Summary Reports.     I have reviewed the Nursing Notes. I have reviewed the NPO Status.   I have reviewed the Medications.     Review of Systems  Anesthesia Hx:  No problems with previous Anesthesia             Denies Family Hx of Anesthesia complications.    Denies Personal Hx of Anesthesia complications.                    Social:  Former Smoker       Cardiovascular:     Hypertension                                          Pulmonary:  Pulmonary Normal                       Endocrine:  Diabetes               Physical Exam  General: Alert and Oriented    Airway:  Mallampati: III   Mouth Opening: Normal  TM Distance: Normal  Tongue: Large  Neck ROM: Normal ROM    Dental:  Edentulous    Chest/Lungs:  Normal Respiratory Rate    Heart:  Rate: Normal  Rhythm: Regular Rhythm    Musculoskeletal:  RUE weakness, Hand contracture      Anesthesia Plan  Type of Anesthesia, risks & benefits discussed:    Anesthesia Type: Gen Natural Airway  Intra-op Monitoring Plan: Standard ASA Monitors  Post Op Pain Control Plan: IV/PO Opioids PRN  Induction:  IV  Airway Plan: Direct  Informed Consent: Informed consent signed with the Patient and all parties understand the risks and agree with anesthesia plan.  All questions answered. Patient consented to blood products? No  ASA Score: 2  Day of Surgery Review of History & Physical: H&P Update referred to the surgeon/provider.  Anesthesia Plan Notes: Nasal cannula vs facemask supplemental oxygenation   For patients with ZOHRA/obesity, may consider SuperNoval  Nasal CPAP      Ready For Surgery From Anesthesia Perspective.     .

## 2024-11-04 NOTE — TRANSFER OF CARE
"Anesthesia Transfer of Care Note    Patient: Lindy Quezada    Procedure(s) Performed: Procedure(s) (LRB):  COLON (N/A)    Patient location: GI    Anesthesia Type: MAC    Transport from OR: Transported from OR on room air with adequate spontaneous ventilation    Post pain: adequate analgesia    Post assessment: no apparent anesthetic complications and tolerated procedure well    Post vital signs: stable    Level of consciousness: awake, alert and responds to stimulation    Nausea/Vomiting: no nausea/vomiting    Complications: none    Transfer of care protocol was followed    Last vitals: Visit Vitals  BP (!) 169/83   Pulse 86   Temp 36.1 °C (97 °F)   Resp 16   Ht 5' 9" (1.753 m)   Wt 97.1 kg (214 lb)   SpO2 98%   BMI 31.60 kg/m²     "

## 2024-11-04 NOTE — ANESTHESIA POSTPROCEDURE EVALUATION
Anesthesia Post Evaluation    Patient: Lindy Quezada    Procedure(s) Performed: Procedure(s) (LRB):  COLON (N/A)    Final Anesthesia Type: general      Patient location during evaluation: GI PACU  Patient participation: Yes- Able to Participate  Level of consciousness: awake and alert  Post-procedure vital signs: reviewed and stable  Pain management: adequate  Airway patency: patent    PONV status at discharge: No PONV  Anesthetic complications: no      Cardiovascular status: hemodynamically stable  Respiratory status: unassisted, spontaneous ventilation and room air  Hydration status: euvolemic  Follow-up not needed.              Vitals Value Taken Time   /69 11/04/24 1029   Temp * 11/04/24 1505   Pulse 79 11/04/24 1031   Resp 17 11/04/24 1031   SpO2 96 % 11/04/24 1030   Vitals shown include unfiled device data.      No case tracking events are documented in the log.      Pain/Chuy Score: Chuy Score: 10 (11/4/2024 10:29 AM)

## 2024-11-04 NOTE — PROVATION PATIENT INSTRUCTIONS
Discharge Summary/Instructions after an Endoscopic Procedure  Patient Name: Lindy Queazda  Patient MRN: [id number]  Patient YOB: 1960  Monday, November 4, 2024  Rory Durand MD  Dear patient,  As a result of recent federal legislation (The Federal Cures Act), you may   receive lab or pathology results from your procedure in your [facility]   account before your physician is able to contact you. Your physician or   their representative will relay the results to you with their   recommendations at their soonest availability.  Thank you,  RESTRICTIONS:  During your procedure today, you received medications for sedation.  These   medications may affect your judgment, balance and coordination.  Therefore,   for 24 hours, you have the following restrictions:   - DO NOT drive a car, operate machinery, make legal/financial decisions,   sign important papers or drink alcohol.    ACTIVITY:  Today: no heavy lifting, straining or running due to procedural   sedation/anesthesia.  The following day: return to full activity including work.  DIET:  Eat and drink normally unless instructed otherwise.     TREATMENT FOR COMMON SIDE EFFECTS:  - Mild abdominal pain, nausea, belching, bloating or excessive gas:  rest,   eat lightly and use a heating pad.  - Sore Throat: treat with throat lozenges and/or gargle with warm salt   water.  - Because air was used during the procedure, expelling large amounts of air   from your rectum or belching is normal.  - If a bowel prep was taken, you may not have a bowel movement for 1-3 days.    This is normal.  SYMPTOMS TO WATCH FOR AND REPORT TO YOUR PHYSICIAN:  1. Abdominal pain or bloating, other than gas cramps.  2. Chest pain.  3. Back pain.  4. Signs of infection such as: chills or fever occurring within 24 hours   after the procedure.  5. Rectal bleeding, which would show as bright red, maroon, or black stools.   (A tablespoon of blood from the rectum is not serious,  especially if   hemorrhoids are present.)  6. Vomiting.  7. Weakness or dizziness.  GO DIRECTLY TO THE NEAREST EMERGENCY ROOM IF YOU HAVE ANY OF THE FOLLOWING:      Difficulty breathing              Chills and/or fever over 101 F   Persistent vomiting and/or vomiting blood   Severe abdominal pain   Severe chest pain   Black, tarry stools   Bleeding- more than one tablespoon   Any other symptom or condition that you feel may need urgent attention  Your doctor recommends these additional instructions:  If any biopsies were taken, your doctors clinic will contact you in 1 to 2   weeks with any results.  - Discharge patient to home (ambulatory).   - Resume previous diet.   - Continue present medications.   - Repeat colonoscopy in 5 years for screening purposes.   - Return to GI office PRN.  For questions, problems or results please call your physician - Rory Durand MD at Work:  [phone].  [facility] ED at [phone]  IF A COMPLICATION OR EMERGENCY SITUATION ARISES AND YOU ARE UNABLE TO REACH   YOUR PHYSICIAN - GO DIRECTLY TO THE EMERGENCY ROOM.  Rory Durand MD  11/4/2024 10:03:03 AM  This report has been verified and signed electronically.  Dear patient,  As a result of recent federal legislation (The Federal Cures Act), you may   receive lab or pathology results from your procedure in your [facility]   account before your physician is able to contact you. Your physician or   their representative will relay the results to you with their   recommendations at their soonest availability.  Thank you,  PROVATION

## 2024-12-20 DIAGNOSIS — R44.0 AUDITORY HALLUCINATIONS: ICD-10-CM

## 2024-12-20 DIAGNOSIS — R51.9 HEADACHE: ICD-10-CM

## 2024-12-20 DIAGNOSIS — R41.3 MEMORY DYSFUNCTION: Primary | ICD-10-CM

## 2025-01-07 ENCOUNTER — OFFICE VISIT (OUTPATIENT)
Dept: NEUROLOGY | Facility: CLINIC | Age: 65
End: 2025-01-07
Payer: MEDICARE

## 2025-01-07 VITALS
HEIGHT: 69 IN | BODY MASS INDEX: 31.7 KG/M2 | SYSTOLIC BLOOD PRESSURE: 140 MMHG | WEIGHT: 214 LBS | DIASTOLIC BLOOD PRESSURE: 90 MMHG

## 2025-01-07 DIAGNOSIS — G81.90 HEMIPLEGIA, UNSPECIFIED ETIOLOGY, UNSPECIFIED HEMIPLEGIA TYPE, UNSPECIFIED LATERALITY: Primary | ICD-10-CM

## 2025-01-07 DIAGNOSIS — E66.01 MORBID OBESITY: ICD-10-CM

## 2025-01-07 PROCEDURE — 99213 OFFICE O/P EST LOW 20 MIN: CPT | Mod: PBBFAC | Performed by: PSYCHIATRY & NEUROLOGY

## 2025-01-07 PROCEDURE — 99203 OFFICE O/P NEW LOW 30 MIN: CPT | Mod: S$PBB,,, | Performed by: PSYCHIATRY & NEUROLOGY

## 2025-01-07 PROCEDURE — 99999 PR PBB SHADOW E&M-EST. PATIENT-LVL III: CPT | Mod: PBBFAC,,, | Performed by: PSYCHIATRY & NEUROLOGY

## 2025-01-07 RX ORDER — INSULIN ASPART INJECTION 100 [IU]/ML
INJECTION, SOLUTION SUBCUTANEOUS
COMMUNITY
Start: 2024-10-02

## 2025-01-07 RX ORDER — HUMAN INSULIN 100 [IU]/ML
INJECTION, SOLUTION SUBCUTANEOUS
COMMUNITY
Start: 2024-10-26

## 2025-01-07 NOTE — PROGRESS NOTES
"[facility]   Outpatient Clinic Visit     SUBJECTIVE:      Chief Complaint: memory dysfunction , headache, and auditory hallucinations (New pt - referred by Dr. Stefano Reece/)     HPI: Lindy Quezada is a 64 y.o. yo female w/ PMH of HTN, HLD, T2DM, CVA who presents as a new patient for memory dysfunction.  Patient is accompanied by sister who aided in history.  Reports patient has been experiencing difficulty with task completion and memory dysfunction for the last few months that is episodic in nature. States she often forgets to complete tasks and once she has started a task she may forget what she was doing and has a hard time starting again. States the patient has good days and bad days. Also states she has a lot of difficulty remembering peoples names, even people she may have known for a long time. Is not able to state if symptoms appear to be progressing over time.  Of note, patient did have a CVA in 2015 resulting in right-sided weakness but denies any cognitive dysfunction since CVA. Was in a nursing home for several years until 2023, now lives with her daughters and family helps to care for her.  She does not do any household work and does not pay any bills.  Is currently in a wheelchair but reports she is able to walk with a cane for short distances. Denies any new-onset headaches or auditory hallucinations.  States it sometimes feels like she has "ocean waves" rolling through her head when she moves her head certain directions but denies any pain.  Otherwise without acute complaints.      Past Medical History:   has a past medical history of CVA (cerebral vascular accident) (2015), Diabetes mellitus, Hyperlipidemia, Hypertension, and Stroke (2015).     Past Surgical History:   has a past surgical history that includes Colonoscopy (N/A, 11/4/2024).     Family History:  family history is not on file.     Social History:   reports that she quit smoking about 50 years ago. Her smoking use " included cigarettes. She has never used smokeless tobacco. She reports that she does not drink alcohol and does not use drugs.     Allergies:  is allergic to metformin.     Home Medications:  Prior to Admission medications    Medication Sig Start Date End Date Taking? Authorizing Provider   amLODIPine (NORVASC) 10 MG tablet Take 10 mg by mouth once daily.   Yes Provider, Historical   clopidogreL (PLAVIX) 75 mg tablet Take 75 mg by mouth once daily.   Yes Provider, Historical   empagliflozin (JARDIANCE) 25 mg tablet Take 25 mg by mouth once daily.   Yes Provider, Historical   famotidine (PEPCID) 20 MG tablet Take 20 mg by mouth 2 (two) times daily.   Yes Provider, Historical   FIASP FLEXTOUCH U-100 INSULIN 100 unit/mL (3 mL) InPn SMARTSIG:10 Unit(s) SUB-Q 3 Times Daily 10/2/24  Yes Provider, Historical   hydrALAZINE (APRESOLINE) 50 MG tablet Take 50 mg by mouth 3 (three) times daily.   Yes Provider, Historical   losartan (COZAAR) 100 MG tablet Take 100 mg by mouth once daily.   Yes Provider, Historical   nebivoloL (BYSTOLIC) 10 MG Tab Take 20 mg by mouth once daily.   Yes Provider, Historical   NOVOLIN R FLEXPEN 100 unit/mL (3 mL) InPn pen Inject into the skin. 10/26/24  Yes Provider, Historical   rosuvastatin (CRESTOR) 40 MG Tab Take 40 mg by mouth every evening.   Yes Provider, Historical   cloNIDine (CATAPRES) 0.1 MG tablet Take 0.1 mg by mouth every 6 (six) hours as needed.  Patient not taking: Reported on 1/7/2025    Provider, Historical   dicyclomine (BENTYL) 10 MG capsule Take 10 mg by mouth 4 (four) times daily before meals and nightly.  Patient not taking: Reported on 1/7/2025    Provider, Historical   glipiZIDE (GLUCOTROL) 5 MG tablet Take 5 mg by mouth daily with breakfast. 10/7/14 12/9/14  Attila Singh MD   lisinopril-hydrochlorothiazide (PRINZIDE,ZESTORETIC) 10-12.5 mg per tablet Take 2 tablets by mouth once daily.  Patient not taking: Reported on 1/7/2025 9/6/14   Attila Singh MD     ROS  "negative unless stated in above HPI      OBJECTIVE:     Vital signs:   BP (!) 140/90 (BP Location: Right arm, Patient Position: Sitting)   Ht 5' 9" (1.753 m)   Wt 97.1 kg (214 lb)   BMI 31.60 kg/m²      Physical Examination:  Mental Status: Alert and oriented x3. Language is fluent with good comprehension.    Cranial Nerve: Ocular movements are intact. Face is symmetric at rest and with activation with intact sensation throughout. Hearing intact to finger rub bilaterally. Muscles of tongue and palate activate symmetrically. No dysarthria. Strength is full in sternocleidomastoid and trapezius bilaterally.    Motor: Strength is 5/5 in left extremities both proximally and distally. 4/5 strength noted to right-sided extremities. Intact fine motor movements on left, decreased on right side.    Sensory: Sensation is intact to light touch, pinprick, vibration, and proprioception throughout. Romberg is negative.    Labs:  CMP:   Lab Results   Component Value Date    GLUCOSE 216 (H) 09/01/2022    CALCIUM 9.4 09/01/2022    ALBUMIN 3.5 09/01/2022    PROT 6.3 09/05/2014     09/01/2022    K 4.2 09/01/2022    CO2 28 09/01/2022     09/01/2022    BUN 17.1 09/01/2022    CREATININE 0.86 09/01/2022    ALKPHOS 90 09/01/2022    ALT 13 09/01/2022    AST 17 09/01/2022    BILITOT 0.5 09/01/2022      CBC:   Lab Results   Component Value Date    WBC 7.5 09/01/2022    HGB 11.6 (L) 09/01/2022    HCT 36.5 (L) 09/01/2022    MCV 92.6 09/01/2022    RDW 14.1 09/01/2022     FLP:   Lab Results   Component Value Date    CHOL 94 09/01/2022    HDL 31 (L) 09/01/2022    LDL 40.00 (L) 09/01/2022    TRIG 113 09/01/2022    TOTALCHOLEST 3 09/01/2022     DM:   Lab Results   Component Value Date    HGBA1C 7.3 (H) 09/01/2022    .8 09/01/2022    LDLCALC 47 06/01/2020    CREATININE 0.86 09/01/2022    CREATRANDUR 34.0 09/01/2014    PROTEINURINE 19 (H) 09/05/2014     Thyroid:   Lab Results   Component Value Date    TSH 1.3307 09/01/2022 "     LFTs:   Lab Results   Component Value Date    LABPROT 6.5 09/01/2022    ALBUMIN 3.5 09/01/2022    AST 17 09/01/2022    ALT 13 09/01/2022    ALKPHOS 90 09/01/2022       ASSESSMENT & PLAN:     Encounter Diagnoses   Name Primary?    Hemiplegia, unspecified etiology, unspecified hemiplegia type, unspecified laterality Yes    Morbid obesity      MRI brain reviewed showing minimal chronic microvascular ischemia and mild atrophy with old pontine infarcts.   Suspect likely vascular dementia vs. Alzheimer's disease   Will need strict control of risk factors including HTN, HLD, and DM  Reassurance provided     Given information for vitamin supplementation     Return to clinic prn if symptoms worsen     Barby Batres MD  [employer] [facility], PGY-3

## 2025-08-23 ENCOUNTER — PATIENT MESSAGE (OUTPATIENT)
Dept: RESEARCH | Facility: HOSPITAL | Age: 65
End: 2025-08-23
Payer: MEDICARE

## (undated) DEVICE — TIP SUCTION YANKAUER

## (undated) DEVICE — KIT SURGICAL COLON .25 1.1OZ

## (undated) DEVICE — BLOCK BLOX BITE DENT RIM 54FR

## (undated) DEVICE — ADAPTER DUAL NSL LUER M-M 7FT

## (undated) DEVICE — COLLECTION SPECIMEN NEPTUNE

## (undated) DEVICE — UNDERPAD PROTECT PLUS 17X24IN

## (undated) DEVICE — KIT CANIST SUCTION 1200CC

## (undated) DEVICE — SOL IRRI STRL WATER 1000ML